# Patient Record
Sex: FEMALE | Race: WHITE | NOT HISPANIC OR LATINO | Employment: OTHER | URBAN - NONMETROPOLITAN AREA
[De-identification: names, ages, dates, MRNs, and addresses within clinical notes are randomized per-mention and may not be internally consistent; named-entity substitution may affect disease eponyms.]

---

## 2021-10-20 ENCOUNTER — APPOINTMENT (OUTPATIENT)
Dept: CT IMAGING | Facility: HOSPITAL | Age: 86
End: 2021-10-20
Attending: EMERGENCY MEDICINE
Payer: MEDICARE

## 2021-10-20 ENCOUNTER — HOSPITAL ENCOUNTER (EMERGENCY)
Facility: HOSPITAL | Age: 86
Discharge: HOME | End: 2021-10-20
Attending: EMERGENCY MEDICINE | Admitting: EMERGENCY MEDICINE
Payer: MEDICARE

## 2021-10-20 ENCOUNTER — APPOINTMENT (OUTPATIENT)
Dept: GENERAL RADIOLOGY | Facility: HOSPITAL | Age: 86
End: 2021-10-20
Attending: EMERGENCY MEDICINE
Payer: MEDICARE

## 2021-10-20 VITALS
DIASTOLIC BLOOD PRESSURE: 80 MMHG | TEMPERATURE: 97.9 F | HEART RATE: 70 BPM | RESPIRATION RATE: 17 BRPM | SYSTOLIC BLOOD PRESSURE: 140 MMHG | OXYGEN SATURATION: 98 %

## 2021-10-20 DIAGNOSIS — R42 VERTIGO: ICD-10-CM

## 2021-10-20 LAB
ANION GAP SERPL CALCULATED.3IONS-SCNC: 5 MMOL/L (ref 3–14)
BASOPHILS # BLD AUTO: 0.1 10E3/UL (ref 0–0.2)
BASOPHILS NFR BLD AUTO: 1 %
BUN SERPL-MCNC: 13 MG/DL (ref 7–30)
CALCIUM SERPL-MCNC: 9.4 MG/DL (ref 8.5–10.1)
CHLORIDE BLD-SCNC: 97 MMOL/L (ref 94–109)
CO2 SERPL-SCNC: 29 MMOL/L (ref 20–32)
CREAT SERPL-MCNC: 0.73 MG/DL (ref 0.52–1.04)
EOSINOPHIL # BLD AUTO: 0.3 10E3/UL (ref 0–0.7)
EOSINOPHIL NFR BLD AUTO: 7 %
ERYTHROCYTE [DISTWIDTH] IN BLOOD BY AUTOMATED COUNT: 11.9 % (ref 10–15)
GFR SERPL CREATININE-BSD FRML MDRD: 75 ML/MIN/1.73M2
GLUCOSE BLD-MCNC: 156 MG/DL (ref 70–99)
HCT VFR BLD AUTO: 35.7 % (ref 35–47)
HGB BLD-MCNC: 12.1 G/DL (ref 11.7–15.7)
IMM GRANULOCYTES # BLD: 0 10E3/UL
IMM GRANULOCYTES NFR BLD: 0 %
LYMPHOCYTES # BLD AUTO: 0.7 10E3/UL (ref 0.8–5.3)
LYMPHOCYTES NFR BLD AUTO: 15 %
MCH RBC QN AUTO: 30.6 PG (ref 26.5–33)
MCHC RBC AUTO-ENTMCNC: 33.9 G/DL (ref 31.5–36.5)
MCV RBC AUTO: 90 FL (ref 78–100)
MONOCYTES # BLD AUTO: 0.5 10E3/UL (ref 0–1.3)
MONOCYTES NFR BLD AUTO: 10 %
NEUTROPHILS # BLD AUTO: 3.3 10E3/UL (ref 1.6–8.3)
NEUTROPHILS NFR BLD AUTO: 67 %
NRBC # BLD AUTO: 0 10E3/UL
NRBC BLD AUTO-RTO: 0 /100
NT-PROBNP SERPL-MCNC: 1612 PG/ML (ref 0–1800)
PLATELET # BLD AUTO: 276 10E3/UL (ref 150–450)
POTASSIUM BLD-SCNC: 3.9 MMOL/L (ref 3.4–5.3)
RBC # BLD AUTO: 3.96 10E6/UL (ref 3.8–5.2)
SODIUM SERPL-SCNC: 131 MMOL/L (ref 133–144)
TROPONIN I SERPL-MCNC: <0.015 UG/L (ref 0–0.04)
WBC # BLD AUTO: 4.9 10E3/UL (ref 4–11)

## 2021-10-20 PROCEDURE — 99285 EMERGENCY DEPT VISIT HI MDM: CPT | Performed by: EMERGENCY MEDICINE

## 2021-10-20 PROCEDURE — 70450 CT HEAD/BRAIN W/O DYE: CPT

## 2021-10-20 PROCEDURE — 71045 X-RAY EXAM CHEST 1 VIEW: CPT

## 2021-10-20 PROCEDURE — 83880 ASSAY OF NATRIURETIC PEPTIDE: CPT | Performed by: EMERGENCY MEDICINE

## 2021-10-20 PROCEDURE — 84484 ASSAY OF TROPONIN QUANT: CPT | Performed by: EMERGENCY MEDICINE

## 2021-10-20 PROCEDURE — 93005 ELECTROCARDIOGRAM TRACING: CPT

## 2021-10-20 PROCEDURE — 80048 BASIC METABOLIC PNL TOTAL CA: CPT | Performed by: EMERGENCY MEDICINE

## 2021-10-20 PROCEDURE — 99285 EMERGENCY DEPT VISIT HI MDM: CPT | Mod: 25

## 2021-10-20 PROCEDURE — 36415 COLL VENOUS BLD VENIPUNCTURE: CPT | Performed by: EMERGENCY MEDICINE

## 2021-10-20 PROCEDURE — 85025 COMPLETE CBC W/AUTO DIFF WBC: CPT | Performed by: EMERGENCY MEDICINE

## 2021-10-20 PROCEDURE — 250N000013 HC RX MED GY IP 250 OP 250 PS 637: Performed by: EMERGENCY MEDICINE

## 2021-10-20 RX ORDER — MECLIZINE HYDROCHLORIDE 25 MG/1
25 TABLET ORAL 3 TIMES DAILY PRN
Qty: 30 TABLET | Refills: 0 | Status: SHIPPED | OUTPATIENT
Start: 2021-10-20

## 2021-10-20 RX ORDER — HYDROCHLOROTHIAZIDE 12.5 MG/1
25 CAPSULE ORAL DAILY
COMMUNITY

## 2021-10-20 RX ORDER — ISOSORBIDE MONONITRATE 20 MG/1
20 TABLET ORAL 2 TIMES DAILY
COMMUNITY

## 2021-10-20 RX ORDER — LOSARTAN POTASSIUM 25 MG/1
25 TABLET ORAL DAILY
COMMUNITY

## 2021-10-20 RX ORDER — MECLIZINE HYDROCHLORIDE 25 MG/1
25 TABLET ORAL ONCE
Status: COMPLETED | OUTPATIENT
Start: 2021-10-20 | End: 2021-10-20

## 2021-10-20 RX ADMIN — MECLIZINE HYDROCHLORIDE 25 MG: 25 TABLET ORAL at 10:38

## 2021-10-20 NOTE — ED TRIAGE NOTES
Patient was dizzy this am for a short period in bed. Was fine when she was standing up. Bent over to get batteries out of the cupboard and became dizzy

## 2021-10-20 NOTE — ED NOTES
Patient arrived via EMS with reports of bending over and becoming dizzy and fell onto her left side. Patient denies loss of consciousness. Patient denies any pain at this time. Patient reports she has not had this happen for a very long time. Patient's daughter told mother to take BP and she was bending over to get batteries and fell over due to dizziness.

## 2021-10-20 NOTE — ED PROVIDER NOTES
EMERGENCY DEPARTMENT ENCOUNTER      NAME: Trudy Wall  AGE: 86 year old female  YOB: 1935  MRN: [id number]  EVALUATION DATE & TIME: 10/20/2021  9:53 AM    PCP: Katie Mayfield    ED PROVIDER: Herrera Concepcion D.O.      Chief Complaint   Patient presents with     Syncope         HPI    Patient information was obtained from: patient       Trudy Wall is a 86 year old female with known history of CAD, hypertension, type 2 diabetes, who presents to the emergency department with complaint of room spinning dizziness.  Patient had 2 episodes this morning both of them resolving relatively within minutes.  Patient reported the 1st episode happened while in bed, 2nd happen when she bent over causing her to fall and hitting her head without loss of consciousness.  Patient reports that she had not taken her blood pressure medications prior to the episodes of dizziness.  She denies any chest pain or shortness of breath.  She denies numbness/tingling/weakness.  She denies headache.  Patient has no chest pain or shortness of breath.  Denies cough congestion or sore throat.  Denies fever.  Denies additional complaints at this time.      REVIEW OF SYSTEMS  Constitutional:  Denies fever, chills, weight loss or weakness  Eyes:  No pain, discharge, redness  HENT:  Denies sore throat, ear pain, congestion  Respiratory: No SOB, wheeze or cough  Cardiovascular:  No CP, palpitations  GI:  Denies abdominal pain, nausea, vomiting, diarrhea  : Denies dysuria, hematuria  Musculoskeletal:  Denies any new muscle/joint pain, swelling or loss of function.  Skin:  Denies rash, pallor  Neurologic: Reports room spinning dizziness now resolved  Lymph: Denies swollen nodes    All other systems negative unless noted in HPI.    PAST MEDICAL HISTORY:  No past medical history on file.    PAST SURGICAL HISTORY:  No past surgical history on file.      CURRENT MEDICATIONS:    No current facility-administered medications for this  encounter.     Current Outpatient Medications   Medication     hydrochlorothiazide (MICROZIDE) 12.5 MG capsule     isosorbide mononitrate (ISMO/MONOKET) 20 MG tablet     losartan (COZAAR) 25 MG tablet     meclizine (ANTIVERT) 25 MG tablet     metFORMIN (GLUCOPHAGE) 500 MG tablet         ALLERGIES:  Allergies   Allergen Reactions     Amoxicillin      Bactrim [Sulfamethoxazole W/Trimethoprim]        FAMILY HISTORY:  No family history on file.    SOCIAL HISTORY:  Social History     Socioeconomic History     Marital status: Single     Spouse name: Not on file     Number of children: Not on file     Years of education: Not on file     Highest education level: Not on file   Occupational History     Not on file   Tobacco Use     Smoking status: Not on file   Substance and Sexual Activity     Alcohol use: Not on file     Drug use: Not on file     Sexual activity: Not on file   Other Topics Concern     Not on file   Social History Narrative     Not on file     Social Determinants of Health     Financial Resource Strain:      Difficulty of Paying Living Expenses:    Food Insecurity:      Worried About Running Out of Food in the Last Year:      Ran Out of Food in the Last Year:    Transportation Needs:      Lack of Transportation (Medical):      Lack of Transportation (Non-Medical):    Physical Activity:      Days of Exercise per Week:      Minutes of Exercise per Session:    Stress:      Feeling of Stress :    Social Connections:      Frequency of Communication with Friends and Family:      Frequency of Social Gatherings with Friends and Family:      Attends [employer] Services:      Active Member of Clubs or Organizations:      Attends Club or Organization Meetings:      Marital Status:    Intimate Partner Violence:      Fear of Current or Ex-Partner:      Emotionally Abused:      Physically Abused:      Sexually Abused:        VITALS:  Patient Vitals for the past 24 hrs:   BP Temp Temp src Pulse Resp SpO2   10/20/21 1200  140/80 97.9  F (36.6  C) Tympanic 70 17 98 %   10/20/21 1100 161/80 -- -- 70 -- 97 %   10/20/21 1000 173/84 -- -- 70 18 98 %   10/20/21 0954 154/94 (!) 96.4  F (35.8  C) Tympanic 70 -- 98 %       PHYSICAL EXAM    VITAL SIGNS: /80   Pulse 70   Temp 97.9  F (36.6  C) (Tympanic)   Resp 17   SpO2 98%     General Appearance: Well-appearing, well-nourished, no acute distress   Head:  Normocephalic, without obvious abnormality, atraumatic  Eyes:  PERRL, conjunctiva/corneas clear, EOM's intact,  ENT:  Lips, mucosa, and tongue normal, membranes are moist without pallor  Neck:  Normal ROM, symmetrical, trachea midline    Cardio:  Regular rate and rhythm, no murmur, rub or gallop, 2+ pulses symmetric in all extremities  Pulm:  Clear to auscultation bilaterally, respirations unlabored,  Abdomen:  Soft, non-tender, no rebound or guarding.  Musculoskeletal: Full ROM, no edema, no cyanosis, good ROM of major joints  Integument:  Warm, Dry, No erythema, No rash.    Neurologic:  Patient is alert and oriented ×3.  Face is symmetric.  Speech is normal.  Visual fields are full.  Cranial nerves II through XII are grossly intact. Motor tone is 5/5 and equal in both upper and lower extremities.  Bilateral symmetric sensation grossly intact upper and lower.  Neg finger-nose. Neg heel-shin.  Neg Rhomberg. Reflexes are 2/4 throughout.    Psychiatric:  Affect normal, Judgment normal, Mood normal.      LABS  Results for orders placed or performed during the hospital encounter of 10/20/21 (from the past 24 hour(s))   CBC with platelets differential    Narrative    The following orders were created for panel order CBC with platelets differential.  Procedure                               Abnormality         Status                     ---------                               -----------         ------                     CBC with platelets and d...[[id number]]  Abnormal            Final result                 Please view results for these  tests on the individual orders.   Basic metabolic panel   Result Value Ref Range    Sodium 131 (L) 133 - 144 mmol/L    Potassium 3.9 3.4 - 5.3 mmol/L    Chloride 97 94 - 109 mmol/L    Carbon Dioxide (CO2) 29 20 - 32 mmol/L    Anion Gap 5 3 - 14 mmol/L    Urea Nitrogen 13 7 - 30 mg/dL    Creatinine 0.73 0.52 - 1.04 mg/dL    Calcium 9.4 8.5 - 10.1 mg/dL    Glucose 156 (H) 70 - 99 mg/dL    GFR Estimate 75 >60 mL/min/1.73m2   Troponin I   Result Value Ref Range    Troponin I <0.015 0.000 - 0.045 ug/L   NT pro BNP   Result Value Ref Range    N terminal Pro BNP Inpatient 1,612 0-1,800 pg/mL   CBC with platelets and differential   Result Value Ref Range    WBC Count 4.9 4.0 - 11.0 10e3/uL    RBC Count 3.96 3.80 - 5.20 10e6/uL    Hemoglobin 12.1 11.7 - 15.7 g/dL    Hematocrit 35.7 35.0 - 47.0 %    MCV 90 78 - 100 fL    MCH 30.6 26.5 - 33.0 pg    MCHC 33.9 31.5 - 36.5 g/dL    RDW 11.9 10.0 - 15.0 %    Platelet Count 276 150 - 450 10e3/uL    % Neutrophils 67 %    % Lymphocytes 15 %    % Monocytes 10 %    % Eosinophils 7 %    % Basophils 1 %    % Immature Granulocytes 0 %    NRBCs per 100 WBC 0 <1 /100    Absolute Neutrophils 3.3 1.6 - 8.3 10e3/uL    Absolute Lymphocytes 0.7 (L) 0.8 - 5.3 10e3/uL    Absolute Monocytes 0.5 0.0 - 1.3 10e3/uL    Absolute Eosinophils 0.3 0.0 - 0.7 10e3/uL    Absolute Basophils 0.1 0.0 - 0.2 10e3/uL    Absolute Immature Granulocytes 0.0 <=0.0 10e3/uL    Absolute NRBCs 0.0 10e3/uL   XR Chest Port 1 View    Narrative    PROCEDURE:  XR CHEST PORT 1 VIEW    HISTORY:  Dizziness.     COMPARISON:  None.    FINDINGS:   There is borderline heart size. There is a transvenous pacemaker in  place. The pulmonary vasculature is normal.  The lungs are clear. No  pleural effusion or pneumothorax.      Impression    IMPRESSION:  Borderline cardiomegaly. No active pulmonary infiltrates       KAYLA NAVARRETE MD         SYSTEM ID:  [id number]   Head CT w/o contrast    Narrative    PROCEDURE: CT HEAD W/O CONTRAST      HISTORY: Head trauma, minor (Age >= 65y).    COMPARISON: None.    TECHNIQUE:  Helical images of the head from the foramen magnum to the  vertex were obtained without contrast.    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 The  ventricular system is normal in size. There is some white matter low  density both hemispheres consistent with small vessel changes. There  are no masses or ventricular shifts. The cranial vault is intact.  The  visualized paranasal sinuses are clear.      Impression    IMPRESSION: No acute brain abnormality.      KAYLA NAVARRETE MD         SYSTEM ID:  [id number]         RADIOLOGY  Head CT w/o contrast   Final Result   IMPRESSION: No acute brain abnormality.        KAYLA NAVARRETE MD            SYSTEM ID:  [id number]      XR Chest Port 1 View   Final Result   IMPRESSION:  Borderline cardiomegaly. No active pulmonary infiltrates          KAYLA NAVARRETE MD            SYSTEM ID:  [id number]             EKG:    Rate: 70 bpm  Rhythm: Ventricular paced rhythm  Axis: Normal  Interval: Normal  Conduction: Normal  QRS: Wide  ST: Normal  T-wave: Normal  QT: Not prolonged  Comparison EKG: No previous available for comparison  Impression:  No acute ischemic change   I have independently reviewed and interpreted today's EKG, pending Cardiologist read      FINAL IMPRESSION:  1. Vertigo          ED COURSE & MEDICAL DECISION MAKING:    10:27 AM I met with the patient to gather history and to perform my initial exam. I discussed the plan for care while in the Emergency Department.    Pertinent Labs & Imaging studies reviewed. (See chart for details)  86 year old female presents to the Emergency Department for evaluation of room spinning dizziness.  Patient had 2 separate episodes today, both that were provoked by movement.  Both resolved with in minutes, when she was standing still.  Neuro exam on this patient is completely unremarkable, including normal Romberg and finger-nose/heel shin.   She is asymptomatic in the emergency department.  However, the patient did hit her head with the second episode and therefore a CT scan was performed that was unremarkable.  She is otherwise stable in the emergency department without additional abnormalities.  As her symptoms seem to have abated, and she is tolerating meclizine well, I do not believe she requires admission at this time, as I believe this is most likely peripheral vertigo.  Therefore plan is for discharge on symptomatic control and have follow-up as an outpatient with her primary care provider.  Patient verbalized understand agreement this plan.  Return precautions discussed    At the conclusion of the encounter I discussed the results of all of the tests and the disposition. The questions were answered. The patient or family acknowledged understanding and was agreeable with the care plan.      MEDICATIONS GIVEN IN THE EMERGENCY:  Medications   meclizine (ANTIVERT) tablet 25 mg (25 mg Oral Given 10/20/21 1038)       NEW PRESCRIPTIONS STARTED AT TODAY'S ER VISIT  Discharge Medication List as of 10/20/2021 12:16 PM      START taking these medications    Details   meclizine (ANTIVERT) 25 MG tablet Take 1 tablet (25 mg) by mouth 3 times daily as needed for dizziness, Disp-30 tablet, R-0, E-Prescribe              Herrera Concepcion D.O.  Emergency Medicine  [facility]  [address] [id number] [phone]  Dept: [phone]       Herrera Concepcion,   10/20/21 8496